# Patient Record
Sex: MALE | Race: WHITE | NOT HISPANIC OR LATINO | Employment: FULL TIME | URBAN - METROPOLITAN AREA
[De-identification: names, ages, dates, MRNs, and addresses within clinical notes are randomized per-mention and may not be internally consistent; named-entity substitution may affect disease eponyms.]

---

## 2017-01-03 ENCOUNTER — OFFICE VISIT (OUTPATIENT)
Dept: URGENT CARE | Facility: CLINIC | Age: 27
End: 2017-01-03
Payer: COMMERCIAL

## 2017-01-03 PROCEDURE — 99213 OFFICE O/P EST LOW 20 MIN: CPT

## 2017-11-13 ENCOUNTER — OFFICE VISIT (OUTPATIENT)
Dept: URGENT CARE | Facility: CLINIC | Age: 27
End: 2017-11-13
Payer: COMMERCIAL

## 2017-11-13 PROCEDURE — 99213 OFFICE O/P EST LOW 20 MIN: CPT

## 2017-11-14 NOTE — PROGRESS NOTES
Assessment    1  Acute maxillary sinusitis, recurrence not specified (461 0) (J01 00)    Plan  Acute maxillary sinusitis, recurrence not specified    · Amoxicillin-Pot Clavulanate 875-125 MG Oral Tablet (Augmentin); TAKE 1 TABLETEVERY 12 HOURS UNTIL GONE    Discussion/Summary  Discussion Summary:   Discussed viral vs bacterial infection  Recommend OTC meds for symptoms, Augmentin as directed  call or return for problems/concerns  Medication Side Effects Reviewed: Possible side effects of new medications were reviewed with the patient/guardian today  Understands and agrees with treatment plan: The treatment plan was reviewed with the patient/guardian  The patient/guardian understands and agrees with the treatment plan      Chief Complaint    1  Nasal Symptoms  Chief Complaint Free Text Note Form: Pt c/o sinus pressure and congestion for three days  History of Present Illness  HPI: c/o sinus congestion, cough x 5 days  tried Tylenol Cold with minimal relief  Hospital Based Practices Required Assessment:  Abuse And Domestic Violence Screen   Yes, the patient is safe at home  -- The patient states no one is hurting them  Depression And Suicide Screen  No, the patient has not had thoughts of hurting themself  No, the patient has not felt depressed in the past 7 days  Prefered Language is  [place]  Primary Language is  English  Review of Systems  Focused-Male:  Constitutional: feeling tired, but-- no fever  ENT: nasal discharge  Cardiovascular: no complaints of slow or fast heart rate, no chest pain, no palpitations, no leg claudication or lower extremity edema  Respiratory: cough  Gastrointestinal: no complaints of abdominal pain, no constipation, no nausea or vomiting, no diarrhea or bloody stools  Genitourinary: no complaints of dysuria or incontinence, no hesitancy, no nocturia, no genital lesion, no inadequacy of penile erection    Musculoskeletal: no complaints of arthralgia, no myalgia, no joint swelling or stiffness, no limb pain or swelling  Integumentary: no complaints of skin rash or lesion, no itching or dry skin, no skin wounds  Neurological: no complaints of headache, no confusion, no numbness or tingling, no dizziness or fainting  Active Problems  1  Acute upper respiratory infection (465 9) (J06 9)    Past Medical History  1  No pertinent past medical history   2  History of Tick bite (919 4,E906 4) [facility])  Active Problems And Past Medical History Reviewed: The active problems and past medical history were reviewed and updated today  Family History  Family History Reviewed: The family history was reviewed and updated today  Social History   · Never a smoker  Social History Reviewed: The social history was reviewed and updated today  Surgical History  1  History of Oral Surgery Tooth Extraction  Surgical History Reviewed: The surgical history was reviewed and updated today  Current Meds  Medication List Reviewed: The medication list was reviewed and updated today  Allergies    1  No Known Drug Allergies    Vitals  Signs   Recorded: [id number] 10:33AM   Temperature: 98 3 F  Heart Rate: 65  Respiration: 20  Systolic: 617  Diastolic: 60  Height: 5 ft 9 in  Weight: 236 lb 8 83 oz  BMI Calculated: 34 93  BSA Calculated: 2 22  O2 Saturation: 97    Physical Exam   Constitutional  General appearance: No acute distress, well appearing and well nourished  Eyes  Conjunctiva and lids: No swelling, erythema, or discharge  Ears, Nose, Mouth, and Throat  External inspection of ears and nose: Normal    Otoscopic examination: Tympanic membrance translucent with normal light reflex  Canals patent without erythema  Nasal mucosa, septum, and turbinates: Normal without edema or erythema  Oropharynx: Normal with no erythema, edema, exudate or lesions  -- + sinus congestion, Tenderness with palp L>R maxillary    Pulmonary  Respiratory effort: No increased work of breathing or signs of respiratory distress  Auscultation of lungs: Clear to auscultation  Cardiovascular  Auscultation of heart: Normal rate and rhythm, normal S1 and S2, without murmurs  Lymphatic  Palpation of lymph nodes in neck: No lymphadenopathy  Musculoskeletal  Gait and station: Normal    Digits and nails: Normal without clubbing or cyanosis     Inspection/palpation of joints, bones, and muscles: Normal    Psychiatric  Orientation to person, place and time: Normal    Mood and affect: Normal        Signatures   Electronically signed by : Estrellita Nash; Nov 13 2017 10:51AM EST                       (Author)    Electronically signed by : CHADD Koehler ; Nov 13 2017  5:14PM EST                       (Co-author)

## 2019-09-30 ENCOUNTER — APPOINTMENT (OUTPATIENT)
Dept: URGENT CARE | Facility: CLINIC | Age: 29
End: 2019-09-30
Payer: OTHER MISCELLANEOUS

## 2019-09-30 PROCEDURE — 99283 EMERGENCY DEPT VISIT LOW MDM: CPT | Performed by: PHYSICIAN ASSISTANT

## 2019-09-30 PROCEDURE — G0382 LEV 3 HOSP TYPE B ED VISIT: HCPCS | Performed by: PHYSICIAN ASSISTANT

## 2021-09-21 ENCOUNTER — OFFICE VISIT (OUTPATIENT)
Dept: URGENT CARE | Facility: CLINIC | Age: 31
End: 2021-09-21
Payer: COMMERCIAL

## 2021-09-21 DIAGNOSIS — Z20.822 CLOSE EXPOSURE TO COVID-19 VIRUS: Primary | ICD-10-CM

## 2021-09-21 DIAGNOSIS — R09.81 NASAL CONGESTION: ICD-10-CM

## 2021-09-21 PROCEDURE — U0003 INFECTIOUS AGENT DETECTION BY NUCLEIC ACID (DNA OR RNA); SEVERE ACUTE RESPIRATORY SYNDROME CORONAVIRUS 2 (SARS-COV-2) (CORONAVIRUS DISEASE [COVID-19]), AMPLIFIED PROBE TECHNIQUE, MAKING USE OF HIGH THROUGHPUT TECHNOLOGIES AS DESCRIBED BY CMS-2020-01-R: HCPCS | Performed by: PHYSICIAN ASSISTANT

## 2021-09-21 PROCEDURE — U0005 INFEC AGEN DETEC AMPLI PROBE: HCPCS | Performed by: PHYSICIAN ASSISTANT

## 2021-09-21 PROCEDURE — 99212 OFFICE O/P EST SF 10 MIN: CPT | Performed by: PHYSICIAN ASSISTANT

## 2021-09-21 NOTE — PATIENT INSTRUCTIONS

## 2021-09-21 NOTE — PROGRESS NOTES
[facility]        NAME: Khushbu Hernández is a 26 y o  male  : 1990    MRN: [id number]  DATE: 2021  TIME: 8:48 AM    Assessment and Plan   Close exposure to COVID-19 virus [Z20 822]  1  Close exposure to COVID-19 virus  Novel Coronavirus (Covid-19),PCR [facility] - [facility] Collection   2  Nasal congestion  Novel Coronavirus (Covid-19),PCR [facility] - Office Collection         Patient Instructions       Follow up with PCP in 3-5 days  Proceed to  ER if symptoms worsen  Chief Complaint     Chief Complaint   Patient presents with    Nasal Congestion     x 3 days     COVID-19     family members  positive for covid          History of Present Illness       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, however, he was exposed to someone who tested positive 5 days ago  Has occasional nasal congestion  Denies fevers chills fatigue cough shortness of breath nausea vomiting diarrhea body aches headache dizziness or rashes  Review of Systems   Review of Systems   Constitutional: Negative for chills, fatigue and fever  HENT: Positive for congestion  Negative for rhinorrhea and sore throat  Respiratory: Negative for cough and shortness of breath  Gastrointestinal: Negative for diarrhea, nausea and vomiting  Musculoskeletal: Negative for myalgias  Skin: Negative for rash  Neurological: Negative for dizziness and headaches  Current Medications     No current outpatient medications on file  Current Allergies     Allergies as of 2021    (No Known Allergies)            The following portions of the patient's history were reviewed and updated as appropriate: allergies, current medications, past family history, past medical history, past social history, past surgical history and problem list      History reviewed  No pertinent past medical history  History reviewed  No pertinent surgical history  No family history on file        Medications have been verified  Objective   There were no vitals taken for this visit  No LMP for male patient         Physical Exam     Physical Exam

## 2021-09-22 DIAGNOSIS — Z20.822 EXPOSURE TO CONFIRMED CASE OF COVID-19: Primary | ICD-10-CM

## 2021-09-22 LAB — SARS-COV-2 RNA RESP QL NAA+PROBE: NORMAL

## 2021-09-22 PROCEDURE — U0003 INFECTIOUS AGENT DETECTION BY NUCLEIC ACID (DNA OR RNA); SEVERE ACUTE RESPIRATORY SYNDROME CORONAVIRUS 2 (SARS-COV-2) (CORONAVIRUS DISEASE [COVID-19]), AMPLIFIED PROBE TECHNIQUE, MAKING USE OF HIGH THROUGHPUT TECHNOLOGIES AS DESCRIBED BY CMS-2020-01-R: HCPCS | Performed by: PHYSICIAN ASSISTANT

## 2021-09-22 PROCEDURE — U0005 INFEC AGEN DETEC AMPLI PROBE: HCPCS | Performed by: PHYSICIAN ASSISTANT

## 2022-05-07 ENCOUNTER — OFFICE VISIT (OUTPATIENT)
Dept: URGENT CARE | Facility: CLINIC | Age: 32
End: 2022-05-07
Payer: COMMERCIAL

## 2022-05-07 VITALS — OXYGEN SATURATION: 98 % | RESPIRATION RATE: 18 BRPM | TEMPERATURE: 97.7 F | HEART RATE: 63 BPM

## 2022-05-07 DIAGNOSIS — J01.10 ACUTE NON-RECURRENT FRONTAL SINUSITIS: Primary | ICD-10-CM

## 2022-05-07 PROCEDURE — 99213 OFFICE O/P EST LOW 20 MIN: CPT | Performed by: NURSE PRACTITIONER

## 2022-05-07 RX ORDER — AMOXICILLIN AND CLAVULANATE POTASSIUM 875; 125 MG/1; MG/1
1 TABLET, FILM COATED ORAL EVERY 12 HOURS SCHEDULED
Qty: 14 TABLET | Refills: 0 | Status: SHIPPED | OUTPATIENT
Start: 2022-05-07 | End: 2022-05-14

## 2022-05-07 NOTE — PATIENT INSTRUCTIONS
Take the Augmentin as ordered until completed  Eat yogurt or take a probiotic to restore good bacteria to your gut; this helps prevent stomach irritation/diarrhea while on an antibiotic  Over the counter medications may still be used to treat symptoms  Sinusitis   AMBULATORY CARE:   Sinusitis  is inflammation or infection of your sinuses  Sinusitis is most often caused by a virus  Acute sinusitis may last up to 12 weeks  Chronic sinusitis lasts longer than 12 weeks  Recurrent sinusitis means you have 4 or more infections in 1 year  Common signs and symptoms:   · Fever    · Pain, pressure, redness, or swelling around the forehead, cheeks, or eyes    · Thick yellow or green discharge from your nose    · Tenderness when you touch your face over your sinuses    · Dry cough that happens mostly at night or when you lie down    · Headache and face pain that is worse when you lean forward    · Tooth pain, or pain when you chew    Seek care immediately if:   · You have trouble breathing or wheezing that is getting worse  · You have a stiff neck, a fever, or a bad headache  · You cannot open your eye  · Your eyeball bulges out or you cannot move your eye  · You are more sleepy than normal, or you notice changes in your ability to think, move, or talk  · You have swelling of your forehead or scalp  Call your doctor if:   · You have vision changes, such as double vision  · Your eye and eyelid are red, swollen, and painful  · Your symptoms do not improve or go away after 10 days  · You have nausea and are vomiting  · Your nose is bleeding  · You have questions or concerns about your condition or care  Medicines: Your symptoms may go away on their own  Your healthcare provider may recommend watchful waiting for up to 10 days before starting antibiotics  You may need any of the following:  · Acetaminophen  decreases pain and fever  It is available without a doctor's order   Ask how much to take and how often to take it  Follow directions  Read the labels of all other medicines you are using to see if they also contain acetaminophen, or ask your doctor or pharmacist  Acetaminophen can cause liver damage if not taken correctly  Do not use more than 4 grams (4,000 milligrams) total of acetaminophen in one day  · NSAIDs , such as ibuprofen, help decrease swelling, pain, and fever  This medicine is available with or without a doctor's order  NSAIDs can cause stomach bleeding or kidney problems in certain people  If you take blood thinner medicine, always ask your healthcare provider if NSAIDs are safe for you  Always read the medicine label and follow directions  · Nasal steroid sprays  may help decrease inflammation in your nose and sinuses  · Decongestants  help reduce swelling and drain mucus in the nose and sinuses  They may help you breathe easier  · Antihistamines  help dry mucus in the nose and relieve sneezing  · Antibiotics  help treat or prevent a bacterial infection  Self-care:   · Rinse your sinuses as directed  Use a sinus rinse device to rinse your nasal passages with a saline (salt water) solution or distilled water  Do not use tap water  This will help thin the mucus in your nose and rinse away pollen and dirt  It will also help reduce swelling so you can breathe normally  · Use a humidifier  to increase air moisture in your home  This may make it easier for you to breathe and help decrease your cough  · Sleep with your head elevated  Place an extra pillow under your head before you go to sleep to help your sinuses drain  · Drink liquids as directed  Ask your healthcare provider how much liquid to drink each day and which liquids are best for you  Liquids will thin the mucus in your nose and help it drain  Avoid drinks that contain alcohol or caffeine  · Do not smoke, and avoid secondhand smoke    Nicotine and other chemicals in cigarettes and cigars can make your symptoms worse  Ask your healthcare provider for information if you currently smoke and need help to quit  E-cigarettes or smokeless tobacco still contain nicotine  Talk to your healthcare provider before you use these products  Prevent the spread of germs:   · Wash your hands often with soap and water  Wash your hands after you use the bathroom, change a child's diaper, or sneeze  Wash your hands before you prepare or eat food  · Stay away from people who are sick  Some germs spread easily and quickly through contact  Follow up with your doctor as directed: You may be referred to an ear, nose, and throat specialist  Write down your questions so you remember to ask them during your visits  © Copyright [employer] 2022 Information is for End User's use only and may not be sold, redistributed or otherwise used for commercial purposes  All illustrations and images included in CareNotes® are the copyrighted property of A D A M , Inc  or [employer]   The above information is an  only  It is not intended as medical advice for individual conditions or treatments  Talk to your doctor, nurse or pharmacist before following any medical regimen to see if it is safe and effective for you

## 2022-05-07 NOTE — PROGRESS NOTES
[facility]        NAME: Julisa Vences is a 33 y o  male  : 1990    MRN: [id number]  DATE: May 7, 2022  TIME: 3:45 PM      Assessment and Plan     Acute non-recurrent frontal sinusitis [J01 10]  1  Acute non-recurrent frontal sinusitis  amoxicillin-clavulanate (AUGMENTIN) 875-125 mg per tablet         Patient Instructions     Patient Instructions     Take the Augmentin as ordered until completed  Eat yogurt or take a probiotic to restore good bacteria to your gut; this helps prevent stomach irritation/diarrhea while on an antibiotic  Over the counter medications may still be used to treat symptoms  Sinusitis   AMBULATORY CARE:   Sinusitis  is inflammation or infection of your sinuses  Sinusitis is most often caused by a virus  Acute sinusitis may last up to 12 weeks  Chronic sinusitis lasts longer than 12 weeks  Recurrent sinusitis means you have 4 or more infections in 1 year  Common signs and symptoms:   · Fever    · Pain, pressure, redness, or swelling around the forehead, cheeks, or eyes    · Thick yellow or green discharge from your nose    · Tenderness when you touch your face over your sinuses    · Dry cough that happens mostly at night or when you lie down    · Headache and face pain that is worse when you lean forward    · Tooth pain, or pain when you chew    Seek care immediately if:   · You have trouble breathing or wheezing that is getting worse  · You have a stiff neck, a fever, or a bad headache  · You cannot open your eye  · Your eyeball bulges out or you cannot move your eye  · You are more sleepy than normal, or you notice changes in your ability to think, move, or talk  · You have swelling of your forehead or scalp  Call your doctor if:   · You have vision changes, such as double vision  · Your eye and eyelid are red, swollen, and painful  · Your symptoms do not improve or go away after 10 days      · You have nausea and are vomiting  · Your nose is bleeding  · You have questions or concerns about your condition or care  Medicines: Your symptoms may go away on their own  Your healthcare provider may recommend watchful waiting for up to 10 days before starting antibiotics  You may need any of the following:  · Acetaminophen  decreases pain and fever  It is available without a doctor's order  Ask how much to take and how often to take it  Follow directions  Read the labels of all other medicines you are using to see if they also contain acetaminophen, or ask your doctor or pharmacist  Acetaminophen can cause liver damage if not taken correctly  Do not use more than 4 grams (4,000 milligrams) total of acetaminophen in one day  · NSAIDs , such as ibuprofen, help decrease swelling, pain, and fever  This medicine is available with or without a doctor's order  NSAIDs can cause stomach bleeding or kidney problems in certain people  If you take blood thinner medicine, always ask your healthcare provider if NSAIDs are safe for you  Always read the medicine label and follow directions  · Nasal steroid sprays  may help decrease inflammation in your nose and sinuses  · Decongestants  help reduce swelling and drain mucus in the nose and sinuses  They may help you breathe easier  · Antihistamines  help dry mucus in the nose and relieve sneezing  · Antibiotics  help treat or prevent a bacterial infection  Self-care:   · Rinse your sinuses as directed  Use a sinus rinse device to rinse your nasal passages with a saline (salt water) solution or distilled water  Do not use tap water  This will help thin the mucus in your nose and rinse away pollen and dirt  It will also help reduce swelling so you can breathe normally  · Use a humidifier  to increase air moisture in your home  This may make it easier for you to breathe and help decrease your cough  · Sleep with your head elevated    Place an extra pillow under your head before you go to sleep to help your sinuses drain  · Drink liquids as directed  Ask your healthcare provider how much liquid to drink each day and which liquids are best for you  Liquids will thin the mucus in your nose and help it drain  Avoid drinks that contain alcohol or caffeine  · Do not smoke, and avoid secondhand smoke  Nicotine and other chemicals in cigarettes and cigars can make your symptoms worse  Ask your healthcare provider for information if you currently smoke and need help to quit  E-cigarettes or smokeless tobacco still contain nicotine  Talk to your healthcare provider before you use these products  Prevent the spread of germs:   · Wash your hands often with soap and water  Wash your hands after you use the bathroom, change a child's diaper, or sneeze  Wash your hands before you prepare or eat food  · Stay away from people who are sick  Some germs spread easily and quickly through contact  Follow up with your doctor as directed: You may be referred to an ear, nose, and throat specialist  Write down your questions so you remember to ask them during your visits  © Copyright [employer] 2022 Information is for End User's use only and may not be sold, redistributed or otherwise used for commercial purposes  All illustrations and images included in CareNotes® are the copyrighted property of A D A M , Inc  or [address] [place] [address]  The above information is an  only  It is not intended as medical advice for individual conditions or treatments  Talk to your doctor, nurse or pharmacist before following any medical regimen to see if it is safe and effective for you  Follow up with PCP in 3-5 days  Proceed to  ER if symptoms worsen  Chief Complaint     Chief Complaint   Patient presents with    Cold Like Symptoms     sinus congestion for a week            History of Present Illness     Notes a hx of both allergies and sinusitis--usually once every few years  He has had increased congestion for a week that is getting worse not better with pain and tenderness across the frontal sinuses  No specific sick contacts  Review of Systems     Review of Systems   Constitutional: Negative for chills, fatigue and fever  HENT: Positive for congestion, postnasal drip (tiny), sinus pressure and sinus pain  Respiratory: Negative  Gastrointestinal: Negative  Musculoskeletal: Negative for myalgias  Allergic/Immunologic: Positive for environmental allergies  Neurological: Positive for headaches  All other systems reviewed and are negative  Current Medications       Current Outpatient Medications:     amoxicillin-clavulanate (AUGMENTIN) 875-125 mg per tablet, Take 1 tablet by mouth every 12 (twelve) hours for 7 days, Disp: 14 tablet, Rfl: 0    Current Allergies     Allergies as of 05/07/2022    (No Known Allergies)              The following portions of the patient's history were reviewed and updated as appropriate: allergies, current medications, past family history, past medical history, past social history, past surgical history and problem list      History reviewed  No pertinent past medical history  History reviewed  No pertinent surgical history  History reviewed  No pertinent family history  Medications have been verified  Objective     Pulse 63   Temp 97 7 °F (36 5 °C)   Resp 18   SpO2 98%   No LMP for male patient  Physical Exam     Physical Exam  Vitals and nursing note reviewed  Constitutional:       General: He is not in acute distress  Appearance: Normal appearance  He is well-developed and well-groomed  He is not ill-appearing, toxic-appearing or diaphoretic  HENT:      Head: Normocephalic and atraumatic  Right Ear: Hearing, tympanic membrane, ear canal and external ear normal  No decreased hearing noted  No drainage or tenderness   Tympanic membrane is not injected, perforated, erythematous, retracted or bulging  Left Ear: Hearing, tympanic membrane, ear canal and external ear normal  No decreased hearing noted  No drainage or tenderness  Tympanic membrane is not injected, perforated, erythematous, retracted or bulging  Nose: Septal deviation, mucosal edema and congestion present  Right Sinus: Maxillary sinus tenderness and frontal sinus tenderness present  Left Sinus: Frontal sinus tenderness present  No maxillary sinus tenderness  Comments: Sig right frontal pain, mod left frontal pain; occasional mild right maxillary pain  Septum noticeably deviated     Mouth/Throat:      Mouth: Mucous membranes are moist       Pharynx: Uvula midline  No oropharyngeal exudate or posterior oropharyngeal erythema  Tonsils: No tonsillar exudate or tonsillar abscesses  0 on the right  0 on the left  Pulmonary:      Effort: Pulmonary effort is normal  No respiratory distress  Abdominal:      General: There is no distension  Palpations: Abdomen is soft  Musculoskeletal:      Cervical back: Normal range of motion and neck supple  Lymphadenopathy:      Cervical: Cervical adenopathy present  Skin:     General: Skin is warm and dry  Capillary Refill: Capillary refill takes less than 2 seconds  Neurological:      General: No focal deficit present  Mental Status: He is alert and oriented to person, place, and time  Psychiatric:         Mood and Affect: Mood normal          Behavior: Behavior normal  Behavior is cooperative  Thought Content:  Thought content normal          Judgment: Judgment normal

## 2023-03-24 ENCOUNTER — OFFICE VISIT (OUTPATIENT)
Dept: URGENT CARE | Facility: CLINIC | Age: 33
End: 2023-03-24

## 2023-03-24 VITALS
WEIGHT: 239.8 LBS | HEART RATE: 76 BPM | HEIGHT: 68 IN | TEMPERATURE: 97.2 F | OXYGEN SATURATION: 95 % | DIASTOLIC BLOOD PRESSURE: 69 MMHG | BODY MASS INDEX: 36.34 KG/M2 | SYSTOLIC BLOOD PRESSURE: 129 MMHG

## 2023-03-24 DIAGNOSIS — J02.9 SORE THROAT: Primary | ICD-10-CM

## 2023-03-24 LAB — S PYO AG THROAT QL: NEGATIVE

## 2023-03-24 RX ORDER — PREDNISONE 10 MG/1
TABLET ORAL
Qty: 26 TABLET | Refills: 0 | Status: SHIPPED | OUTPATIENT
Start: 2023-03-24

## 2023-03-24 RX ORDER — AMOXICILLIN AND CLAVULANATE POTASSIUM 875; 125 MG/1; MG/1
1 TABLET, FILM COATED ORAL EVERY 12 HOURS SCHEDULED
Qty: 20 TABLET | Refills: 0 | Status: SHIPPED | OUTPATIENT
Start: 2023-03-24 | End: 2023-04-03

## 2023-03-24 NOTE — LETTER
March 24, 2023     Patient: Catalina Galeano   YOB: 1990   Date of Visit: 3/24/2023       To Whom It May Concern: It is my medical opinion that Catalina Galeano may return to work on 3/27/23  If you have any questions or concerns, please don't hesitate to call           Sincerely,        Macy Willis PA-C    CC: No Recipients

## 2023-03-24 NOTE — PROGRESS NOTES
[facility]    NAME: Haroldo Hoyos is a 27 y o  male  : 1990    MRN: [id number]  DATE: 2023  TIME: 9:20 AM    Assessment and Plan   Sore throat [J02 9]  1  Sore throat  POCT rapid strepA    amoxicillin-clavulanate (AUGMENTIN) 875-125 mg per tablet    predniSONE 10 mg tablet    Throat culture          Patient Instructions     Patient Instructions   I have prescribed an antibiotic for the infection  Please take the antibiotic as prescribed and finish the entire prescription  I recommend that the patient takes an over the counter probiotic or eats yogurt with live cultures in it [place]) to keep good bacteria in the gut and help prevent diarrhea  Wash hands frequently to prevent the spread of infection  Can use over the counter cough and cold medications to help with symptoms  Ibuprofen and/or tylenol as needed for pain or fever  If not improving over the next 7-10 days, follow up with PCP  Chief Complaint     Chief Complaint   Patient presents with   • Sore Throat     Since Wednesday, worsening today       History of Present Illness   32 complaint of sore throat, congestion for the last 2 days  Is gotten progressively worse since yesterday  No fever  Review of Systems   Review of Systems   Constitutional: Negative for chills, fatigue and fever  HENT: Positive for sore throat  Negative for congestion, ear pain, postnasal drip and sinus pressure  Respiratory: Negative for cough, shortness of breath and wheezing  Neurological: Negative for headaches  All other systems reviewed and are negative        Current Medications     Current Outpatient Medications:   •  amoxicillin-clavulanate (AUGMENTIN) 875-125 mg per tablet, Take 1 tablet by mouth every 12 (twelve) hours for 10 days, Disp: 20 tablet, Rfl: 0  •  predniSONE 10 mg tablet, Take 3 tabs BID X 2 days, 2 tabs BID X 2 days, 1 tab BID X 2 days, 1 tab daily X 2 days, Disp: 26 tablet, Rfl: 0    Current Allergies Allergies as of 03/24/2023   • (No Known Allergies)          The following portions of the patient's history were reviewed and updated as appropriate: allergies, current medications, past family history, past medical history, past social history, past surgical history and problem list    No past medical history on file  No past surgical history on file  No family history on file  Social History     Socioeconomic History   • Marital status: /Civil Union     Spouse name: Not on file   • Number of children: Not on file   • Years of education: Not on file   • Highest education level: Not on file   Occupational History   • Not on file   Tobacco Use   • Smoking status: Never   • Smokeless tobacco: Never   Vaping Use   • Vaping Use: Never used   Substance and Sexual Activity   • Alcohol use: Not on file   • Drug use: Not on file   • Sexual activity: Not on file   Other Topics Concern   • Not on file   Social History Narrative   • Not on file     Social Determinants of Health     Financial Resource Strain: Not on file   Food Insecurity: Not on file   Transportation Needs: Not on file   Physical Activity: Not on file   Stress: Not on file   Social Connections: Not on file   Intimate Partner Violence: Not on file   Housing Stability: Not on file     Medications have been verified  Objective   /69 (BP Location: Left arm, Patient Position: Sitting)   Pulse 76   Temp (!) 97 2 °F (36 2 °C)   Ht 5' 8" (1 727 m)   Wt 109 kg (239 lb 12 8 oz)   SpO2 95%   BMI 36 46 kg/m²      Physical Exam   Physical Exam  Vitals and nursing note reviewed  Constitutional:       General: He is not in acute distress  Appearance: He is well-developed  HENT:      Head: Normocephalic and atraumatic  Right Ear: Tympanic membrane normal       Left Ear: Tympanic membrane normal       Nose: No mucosal edema  Mouth/Throat:      Pharynx: Uvula midline   Pharyngeal swelling, oropharyngeal exudate and posterior oropharyngeal erythema present  Tonsils: Tonsillar exudate present  3+ on the right  3+ on the left  Cardiovascular:      Rate and Rhythm: Normal rate and regular rhythm  Heart sounds: Normal heart sounds  Pulmonary:      Effort: Pulmonary effort is normal  No respiratory distress  Breath sounds: Normal breath sounds

## 2023-03-25 LAB — BACTERIA THROAT CULT: ABNORMAL
